# Patient Record
Sex: FEMALE | Race: BLACK OR AFRICAN AMERICAN | NOT HISPANIC OR LATINO | URBAN - METROPOLITAN AREA
[De-identification: names, ages, dates, MRNs, and addresses within clinical notes are randomized per-mention and may not be internally consistent; named-entity substitution may affect disease eponyms.]

---

## 2022-06-15 ENCOUNTER — OFFICE VISIT (OUTPATIENT)
Dept: URGENT CARE | Facility: CLINIC | Age: 27
End: 2022-06-15
Payer: COMMERCIAL

## 2022-06-15 VITALS
HEART RATE: 79 BPM | OXYGEN SATURATION: 97 % | WEIGHT: 213.19 LBS | HEIGHT: 64 IN | DIASTOLIC BLOOD PRESSURE: 67 MMHG | BODY MASS INDEX: 36.4 KG/M2 | RESPIRATION RATE: 20 BRPM | SYSTOLIC BLOOD PRESSURE: 102 MMHG | TEMPERATURE: 98 F

## 2022-06-15 DIAGNOSIS — L29.9 ITCHING: ICD-10-CM

## 2022-06-15 DIAGNOSIS — S50.869A INSECT BITE OF FOREARM, UNSPECIFIED LATERALITY, INITIAL ENCOUNTER: Primary | ICD-10-CM
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
## 2022-06-15 PROCEDURE — 99213 OFFICE O/P EST LOW 20 MIN: CPT | Mod: S$PBB,,, | Performed by: NURSE PRACTITIONER

## 2022-06-15 PROCEDURE — 99213 PR OFFICE/OUTPT VISIT, EST, LEVL III, 20-29 MIN: ICD-10-PCS | Mod: S$PBB,,, | Performed by: NURSE PRACTITIONER

## 2022-06-15 PROCEDURE — 99204 OFFICE O/P NEW MOD 45 MIN: CPT | Mod: PBBFAC | Performed by: NURSE PRACTITIONER

## 2022-06-15 RX ORDER — UBROGEPANT 100 MG/1
TABLET ORAL
COMMUNITY
Start: 2022-06-06

## 2022-06-15 RX ORDER — HYDROXYUREA 500 MG/1
4 CAPSULE ORAL DAILY
COMMUNITY
Start: 2022-01-31

## 2022-06-15 RX ORDER — ALPRAZOLAM 0.25 MG/1
0.25 TABLET ORAL EVERY 8 HOURS PRN
COMMUNITY
Start: 2021-11-02

## 2022-06-15 RX ORDER — KETOROLAC TROMETHAMINE 10 MG/1
10 TABLET, FILM COATED ORAL
COMMUNITY
Start: 2022-02-19

## 2022-06-15 RX ORDER — ONDANSETRON 4 MG/1
4 TABLET, ORALLY DISINTEGRATING ORAL EVERY 6 HOURS PRN
COMMUNITY
Start: 2022-04-30

## 2022-06-15 RX ORDER — TRIAMCINOLONE ACETONIDE 1 MG/G
OINTMENT TOPICAL 2 TIMES DAILY
Qty: 30 G | Refills: 0 | Status: SHIPPED | OUTPATIENT
Start: 2022-06-15 | End: 2022-06-29

## 2022-06-16 NOTE — PROGRESS NOTES
"Subjective:       Patient ID: Sarah Sawyer is a 26 y.o. female.    Vitals:  height is 5' 4" (1.626 m) and weight is 96.7 kg (213 lb 3.2 oz).     Chief Complaint: Insect Bite (To right arm )    Pt is a 25 yo female, here today for itching to R arm, swelling around mosquito bites that started a few days ago. Pt states she is allergic to mosquitoes, usually uses hydrocortisone cream and benadryl. Denies sob, difficulty swallowing, drainage.       Neck: neck negative.   Cardiovascular: Negative.    Respiratory: Negative.    Skin: Positive for rash.   Neurological: Negative.        Objective:      Physical Exam   Constitutional: She is oriented to person, place, and time. She appears well-developed.   HENT:   Head: Normocephalic.   Eyes: Conjunctivae and EOM are normal. Pupils are equal, round, and reactive to light.   Neck: Neck supple.   Cardiovascular: Normal rate, regular rhythm and normal heart sounds.   Pulmonary/Chest: Effort normal and breath sounds normal.   Musculoskeletal: Normal range of motion.         General: Normal range of motion.   Neurological: She is alert and oriented to person, place, and time.   Skin: Skin is warm and dry.         Comments: Scattered insect bites noted to R forearm, very slight localized edema, no drainage or erythema   Psychiatric: Her behavior is normal.   Vitals reviewed.        Assessment:       1. Insect bite of forearm, unspecified laterality, initial encounter    2. Itching            No results found for any previous visit.        No results found.   Plan:         Medication as ordered. Cont otc benadryl. If any sob, difficulty swallowing, increased swelling, redness, drainage or any new symptoms then go to ER.   Insect bite of forearm, unspecified laterality, initial encounter  -     triamcinolone acetonide 0.1% (KENALOG) 0.1 % ointment; Apply topically 2 (two) times daily. for 14 days  Dispense: 30 g; Refill: 0    Itching  -     triamcinolone acetonide 0.1% (KENALOG) 0.1 " % ointment; Apply topically 2 (two) times daily. for 14 days  Dispense: 30 g; Refill: 0